# Patient Record
(demographics unavailable — no encounter records)

---

## 2025-01-23 NOTE — CARDIOLOGY SUMMARY
[LVSF ___%] : LV Shortening Fraction [unfilled]% [de-identified] : 1/13/25 [FreeTextEntry1] : Normal sinus rhythm @ 64 bpm NC: 178 ms QRS: 100 ms  QTc: 381 ms P-R-T Axis (50-81-68) Normal voltage and intervals Early repolarization; looks similar to prior EKG Possible LVH No pre-excitation  [de-identified] : 1/13/25 [FreeTextEntry2] : A complete 2D, M-mode, doppler and color flow doppler transthoracic pediatric echocardiogram was performed. The intracardiac anatomy and doppler flow profiles were otherwise normal appearing with the following:   Summary: 1. Trivial tricuspid valve regurgitation. 2. Mild pulmonary valve regurgitation. 3. Trivial mitral valve regurgitation. 4. Normal right ventricular morphology with qualitatively normal size and systolic function. 5. Normal left ventricular size, morphology and systolic function. 6. No pericardial effusion. [de-identified] : 4/18/23 [de-identified] : Fair monitor quality Predominant underlying rhythm: Sinus with sinus bradycardia and sinus tachycardia Appropriate heart rate variability. No identified high grade heart block 1 captured PAC-asymptomatic. No SVT No ventricular ectopy. No VT. No documented patient symptoms

## 2025-01-23 NOTE — CONSULT LETTER
[Today's Date] : [unfilled] [Name] : Name: [unfilled] [] : : ~~ [Today's Date:] : [unfilled] [Dear  ___:] : Dear Dr. [unfilled]: [Consult] : I had the pleasure of evaluating your patient, [unfilled]. My full evaluation follows. [Consult - Single Provider] : Thank you very much for allowing me to participate in the care of this patient. If you have any questions, please do not hesitate to contact me. [Sincerely,] : Sincerely, [FreeTextEntry4] : Piotr Kothari MD [FreeTextEntry5] : [facility] [FreeTextEntry6] : [address] [FreeTextEntry7] : [place] [address] [de-identified] : Louie Miller DO, MPH\par  Pediatric Cardiology\par  [facility]\par

## 2025-01-23 NOTE — CARDIOLOGY SUMMARY
[LVSF ___%] : LV Shortening Fraction [unfilled]% [de-identified] : 1/13/25 [FreeTextEntry1] : Normal sinus rhythm @ 64 bpm CO: 178 ms QRS: 100 ms  QTc: 381 ms P-R-T Axis (50-81-68) Normal voltage and intervals Early repolarization; looks similar to prior EKG Possible LVH No pre-excitation  [de-identified] : 1/13/25 [FreeTextEntry2] : A complete 2D, M-mode, doppler and color flow doppler transthoracic pediatric echocardiogram was performed. The intracardiac anatomy and doppler flow profiles were otherwise normal appearing with the following:   Summary: 1. Trivial tricuspid valve regurgitation. 2. Mild pulmonary valve regurgitation. 3. Trivial mitral valve regurgitation. 4. Normal right ventricular morphology with qualitatively normal size and systolic function. 5. Normal left ventricular size, morphology and systolic function. 6. No pericardial effusion. [de-identified] : 4/18/23 [de-identified] : Fair monitor quality Predominant underlying rhythm: Sinus with sinus bradycardia and sinus tachycardia Appropriate heart rate variability. No identified high grade heart block 1 captured PAC-asymptomatic. No SVT No ventricular ectopy. No VT. No documented patient symptoms

## 2025-01-23 NOTE — CLINICAL NARRATIVE
[Up to Date] : Up to Date [FreeTextEntry1] : as per mom [FreeTextEntry2] : No Covid Vaccine  Covid infection in 2020

## 2025-01-23 NOTE — DISCUSSION/SUMMARY
[PE + No Restrictions] : [unfilled] may participate in the entire physical education program without restriction, including all varsity competitive sports. [Influenza vaccine is recommended] : Influenza vaccine is recommended [FreeTextEntry1] : CRESENCIO  is a well appearing 16 year old who presents without identified concerns for congestive heart failure nor impaired cardiac output by his  past medical history nor on his  current physical exam.   -There is ongoing trivial tricuspid, mild pulmonary and trivial mitral regurgitation in otherwise well functioning valves. This remains inaudible on physical exam and not hemodynamically significant at this time.   -No ongoing chest pain. No palpitations. Prior 24 hour holter without concerns; no patient symptoms during. Discussed limitations in that no documented examples during monitoring period. Asked to keep a journal for improved pattern recognition for our review. Reviewed symptoms of an arrhythmia that should prompt further evaluation.   -Recommended adherence to his asthma action plan. Try inhaler to monitor for symptomatic improvement. Reviewed chest pain symptoms that should prompt notification and sooner evaluation. Parent to schedule pulmonology consultation.   -I explained to Meliton and his parent that his described increase in headaches and dizziness does not appear secondary to cardiac pathology. Symptoms wax and wane with a recent increase in frequency.  There is ongoing orthostatic triggers at times and his vital signs are without induced hypotension but an appreciable increase in heart rate. Inadequate hydration.  I explained the importance of his ongoing neurologic work up for a neurologic etiology.   -Emphasized again need to maintain adequate hydration; drinking at least 8-10 full glasses of water per day.  Avoid excessive caffeine -Eating an adequate, healthy diet. Avoid fasting.  -Standing up slowly from a lying or seated position to avoid these episodes, as well as recognizing the warning signs  and sitting or lying down when they occur.   -increasing salt intake may also help alleviate these symptoms which in discussion with CRESENCIO  will attempt through salty snacks at this time.  -We discussed management strategies including medication should symptoms worsen or fail to improve.  -Regular aerobic exercise; monitor for symptoms with exercise, if developed should prompt cessation and evaluation. -Instructed parent to contact PCP regarding plans for management of "low iron" given potential contribution to symptoms.   I recommended 2 month follow up or sooner pending symptoms and we reviewed signs and symptoms that should prompt medical attention and sooner evaluation. CRESENCIO and family verbalized their understanding and all questions were answered.    [Needs SBE Prophylaxis] : [unfilled] does not need bacterial endocarditis prophylaxis

## 2025-01-23 NOTE — CONSULT LETTER
[Today's Date] : [unfilled] [Name] : Name: [unfilled] [] : : ~~ [Today's Date:] : [unfilled] [Dear  ___:] : Dear Dr. [unfilled]: [Consult] : I had the pleasure of evaluating your patient, [unfilled]. My full evaluation follows. [Consult - Single Provider] : Thank you very much for allowing me to participate in the care of this patient. If you have any questions, please do not hesitate to contact me. [Sincerely,] : Sincerely, [FreeTextEntry4] : Piotr Kothari MD [FreeTextEntry5] : [facility] [FreeTextEntry6] : [address] [FreeTextEntry7] : [place] [address] [de-identified] : Louie Miller DO, MPH\par  Pediatric Cardiology\par  [facility]\par

## 2025-01-23 NOTE — HISTORY OF PRESENT ILLNESS
[FreeTextEntry1] : Gonzalo is a well appearing, active 15 year old who presents for a follow up evaluation of his dizziness and unrelated historic bouts of chest pain.  Gonzalo presents doing well.  He denies any overt chest pain. He currently has a mild URI and reports some coughing with inspiratory tightness last week. Hasn't been using his inhaler. No overt respiratory distress.   He continues to have frequent headaches. He reports return of frequent bouts of brief dizziness; daily often with headache and nausea. Improves with lying down. No syncope. Limited to at rest.   Denies palpitations. Parent reports noticed anxiety around aforementioned symptoms and possible a degree of hyperawareness.   Denies fasting. Not reaching hydration goals. 3 lb weight loss since prior visit.  Hx Autism Spectrum Disorder, ADHD and academic difficulties, anxiety and depression.    Saw Neurology in past with plans for head MRI and EEG. Started on magnesium than; not taking now. Neurology considering migraines and vertigo.  History of low iron denies supplementation and remains on Prozac. Has not seen neurology recently.    Mom: "palpitations,"-nos MGF:  suddenly late 50's; diabetic presumed CAD-no other information known by parent No additionally reported family history of an arrhythmia, aortic aneurysm, unexplained death, bicuspid aortic valve,  congenital heart disease, cardiomyopathy or sudden cardiac death, long QT syndrome, drowning or unexplained accidental death.

## 2025-01-23 NOTE — REVIEW OF SYSTEMS
[Shortness Of Breath] : expressed as feeling short of breath [Headache] : headache [Dizziness] : dizziness [Depression] : depression [Anxiety] : anxiety [Chest Pain] : chest pain  or discomfort [Palpitations] : palpitations [Feeling Poorly] : not feeling poorly (malaise) [Fever] : no fever [Wgt Loss (___ Lbs)] : no recent weight loss [Pallor] : not pale [Eye Discharge] : no eye discharge [Redness] : no redness [Change in Vision] : no change in vision [Nasal Stuffiness] : no nasal congestion [Sore Throat] : no sore throat [Earache] : no earache [Loss Of Hearing] : no hearing loss [Cyanosis] : no cyanosis [Edema] : no edema [Diaphoresis] : not diaphoretic [Exercise Intolerance] : no persistence of exercise intolerance [Orthopnea] : no orthopnea [Fast HR] : no tachycardia [Tachypnea] : not tachypneic [Wheezing] : no wheezing [Cough] : no cough [Vomiting] : no vomiting [Diarrhea] : no diarrhea [Abdominal Pain] : no abdominal pain [Decrease In Appetite] : appetite not decreased [Fainting (Syncope)] : no fainting [Seizure] : no seizures [Limping] : no limping [Joint Pains] : no arthralgias [Joint Swelling] : no joint swelling [Rash] : no rash [Wound problems] : no wound problems [Easy Bruising] : no tendency for easy bruising [Swollen Glands] : no lymphadenopathy [Easy Bleeding] : no ~M tendency for easy bleeding [Nosebleeds] : no epistaxis [Sleep Disturbances] : ~T no sleep disturbances [Hyperactive] : no hyperactive behavior [Failure To Thrive] : no failure to thrive [Short Stature] : short stature was not noted [Jitteriness] : no jitteriness [Heat/Cold Intolerance] : no temperature intolerance [Dec Urine Output] : no oliguria